# Patient Record
Sex: MALE | Race: WHITE
[De-identification: names, ages, dates, MRNs, and addresses within clinical notes are randomized per-mention and may not be internally consistent; named-entity substitution may affect disease eponyms.]

---

## 2019-10-19 ENCOUNTER — HOSPITAL ENCOUNTER (EMERGENCY)
Dept: HOSPITAL 56 - MW.ED | Age: 24
Discharge: HOME | End: 2019-10-19
Payer: SELF-PAY

## 2019-10-19 DIAGNOSIS — Z20.2: ICD-10-CM

## 2019-10-19 DIAGNOSIS — R21: Primary | ICD-10-CM

## 2019-10-19 NOTE — EDM.PDOC
ED HPI GENERAL MEDICAL PROBLEM





- General


Chief Complaint: Skin Complaint


Stated Complaint: DRY SKIN ON TESTICLES


Time Seen by Provider: 10/19/19 15:52


Source of Information: Reports: Patient


History Limitations: Reports: No Limitations





- History of Present Illness


INITIAL COMMENTS - FREE TEXT/NARRATIVE: 


HISTORY AND PHYSICAL:





History of present illness:


Patient is a 24-year-old male presents to the ED with complaint of dry skin on 

his testicles. He states he noticed it yesterday and says it is painful. He 

denies itching, fevers, chills, nausea, vomiting, abdominal pain, dysuria, 

hematuria, penile discharge. 





Patient is requesting to have STD testing but declined treatment for gonorrhea 

and chlamydia today. 





Review of systems: 


As per history of present illness and below otherwise all systems reviewed and 

negative.





Past medical history: 


As per history of present illness and as reviewed below otherwise 

noncontributory.





Surgical history: 


As per history of present illness and as reviewed below otherwise 

noncontributory.





Social history: 


No reported history of drug or alcohol abuse.





Family history: 


As per history of present illness and as reviewed below otherwise 

noncontributory.





Physical exam:


General: Patient sitting comfortably in no acute distress and nontoxic appearing


HEENT: Atraumatic, normocephalic, pupils reactive, negative for conjunctival 

pallor or scleral icterus, mucous membranes moist, throat clear, neck supple, 

nontender, trachea midline. No meningeal signs. 


Lungs: Clear to auscultation, breath sounds equal bilaterally, chest nontender.


Heart: S1S2, regular, negative for clicks, rubs, or overt murmur.


Abdomen: Soft, nondistended, nontender. Negative for masses or 

hepatosplenomegaly. Negative for costovertebral tenderness. No rigidity, rebound

, guarding.


Pelvis: Stable nontender.


Genitourinary: The skin of the scrotum is slightly erythematous and scaly 

throughout without any ulcerations, vesicles, pustules, papules, or verruca. 

There are no penile lesions or discharge. 


Rectal: Deferred.


Extremities: Atraumatic, negative for cords or calf pain. Neurovascular 

unremarkable.


Neuro: Awake, alert, oriented. Cranial nerves II through XII unremarkable. 

Cerebellum unremarkable. Motor and sensory unremarkable throughout. Exam 

nonfocal.





Notes: Scaly skin to testicles does not appear to be cellulitis but will cover 

with keflex and treat for fungal infection with topical clotrimazole. 





Diagnostics:


Urine gonorrhea/chlamydia 





Therapeutics:


[]





Prescriptions:


Keflex, clotrimazole 





Impression: 


Rash of scrotum 





Plan:


Take medications as instructed


Follow up with primary care provider


Return to ED as needed as discussed 





Definitive disposition and diagnosis as appropriate pending reevaluation and 

review of above.








- Related Data


 Allergies











Allergy/AdvReac Type Severity Reaction Status Date / Time


 


No Known Allergies Allergy   Verified 10/19/19 15:50











Home Meds: 


 Home Meds





Cephalexin [Keflex] 500 mg PO BID 10 Days #20 capsule 10/19/19 [Rx]


Clotrimazole [Clotrimazole 1%] 0 gm TOP TID #1 tube 10/19/19 [Rx]











ED ROS GENERAL





- Review of Systems


Review Of Systems: ROS reveals no pertinent complaints other than HPI.





ED EXAM, SKIN/RASH


Exam: See Below (see dictation)





Course





- Vital Signs


Last Recorded V/S: 


 Last Vital Signs











Temp  97.6 F   10/19/19 15:51


 


Pulse  74   10/19/19 16:53


 


Resp  17   10/19/19 15:51


 


BP  127/68   10/19/19 16:53


 


Pulse Ox  97   10/19/19 16:53














- Orders/Labs/Meds


Orders: 


 Active Orders 24 hr











 Category Date Time Status


 


 CHLAMYDIA AND GONORRHEA BY [facility]  10/19/19 16:38 Received














Departure





- Departure


Time of Disposition: 09:58


Disposition: Home, Self-Care 01


Condition: Good


Clinical Impression: 


 Rash on scrotum








- Discharge Information


Prescriptions: 


Cephalexin [Keflex] 500 mg PO BID 10 Days #20 capsule


Clotrimazole [Clotrimazole 1%] 0 gm TOP TID #1 tube


Instructions:  Rash


Referrals: 


PCP,Not In Area [Primary Care Provider] - 


Forms:  ED Department Discharge


Additional Instructions: 


The following information is given to patients seen in the emergency department 

who are being discharged to home. This information is to outline your options 

for follow-up care. We provide all patients seen in our emergency department 

with a follow-up referral.





The need for follow-up, as well as the timing and circumstances, are variable 

depending upon the specifics of your emergency department visit.





If you don't have a primary care physician on staff, we will provide you with a 

referral. We always advise you to contact your personal physician following an 

emergency department visit to inform them of the circumstance of the visit and 

for follow-up with them and/or the need for any referrals to a consulting 

specialist.





The emergency department will also refer you to a specialist when appropriate. 

This referral assures that you have the opportunity for follow-up care with a 

specialist. All of these measure are taken in an effort to provide you with 

optimal care, which includes your follow-up.





Under all circumstances we always encourage you to contact your private 

physician who remains a resource for coordinating your care. When calling for 

follow-up care, please make the office aware that this follow-up is from your 

recent emergency room visit. If for any reason you are refused follow-up, 

please contact the [facility] Emergency 

Department at [phone] and asked to speak to the emergency department 

charge nurse.





[facility]


Primary Care


[address] [place] [address]


Phone: [phone]


Fax: [phone]





[facility]


[address] [place] [address]


Phone: [phone]


Fax: [phone]











Take medications as instructed


Follow up with primary care provider


Return to ED as needed as discussed 














- My Orders


Last 24 Hours: 


My Active Orders





10/19/19 16:38


CHLAMYDIA AND GONORRHEA BY [facility] 














- Assessment/Plan


Last 24 Hours: 


My Active Orders





10/19/19 16:38


CHLAMYDIA AND GONORRHEA BY [facility]